# Patient Record
Sex: FEMALE | Race: WHITE
[De-identification: names, ages, dates, MRNs, and addresses within clinical notes are randomized per-mention and may not be internally consistent; named-entity substitution may affect disease eponyms.]

---

## 2024-03-08 PROBLEM — Z00.00 ENCOUNTER FOR PREVENTIVE HEALTH EXAMINATION: Status: ACTIVE | Noted: 2024-03-08

## 2024-03-15 ENCOUNTER — NON-APPOINTMENT (OUTPATIENT)
Age: 69
End: 2024-03-15

## 2024-03-15 DIAGNOSIS — Z82.1 FAMILY HISTORY OF BLINDNESS AND VISUAL LOSS: ICD-10-CM

## 2024-03-15 DIAGNOSIS — Z86.39 PERSONAL HISTORY OF OTHER ENDOCRINE, NUTRITIONAL AND METABOLIC DISEASE: ICD-10-CM

## 2024-03-15 DIAGNOSIS — F17.200 NICOTINE DEPENDENCE, UNSPECIFIED, UNCOMPLICATED: ICD-10-CM

## 2024-03-15 DIAGNOSIS — Z83.511 FAMILY HISTORY OF GLAUCOMA: ICD-10-CM

## 2024-03-15 DIAGNOSIS — Z92.3 PERSONAL HISTORY OF IRRADIATION: ICD-10-CM

## 2024-03-15 DIAGNOSIS — Z84.1 FAMILY HISTORY OF DISORDERS OF KIDNEY AND URETER: ICD-10-CM

## 2024-03-15 DIAGNOSIS — Z83.518 FAMILY HISTORY OF OTHER SPECIFIED EYE DISORDER: ICD-10-CM

## 2024-03-15 DIAGNOSIS — R73.03 PREDIABETES.: ICD-10-CM

## 2024-03-15 RX ORDER — ATORVASTATIN CALCIUM 20 MG/1
20 TABLET, FILM COATED ORAL
Refills: 0 | Status: ACTIVE | COMMUNITY

## 2024-03-15 RX ORDER — LEVOTHYROXINE SODIUM 100 UG/1
100 CAPSULE ORAL
Refills: 0 | Status: ACTIVE | COMMUNITY

## 2024-03-28 ENCOUNTER — LABORATORY RESULT (OUTPATIENT)
Age: 69
End: 2024-03-28

## 2024-03-28 ENCOUNTER — TRANSCRIPTION ENCOUNTER (OUTPATIENT)
Age: 69
End: 2024-03-28

## 2024-03-28 ENCOUNTER — APPOINTMENT (OUTPATIENT)
Dept: HEMATOLOGY ONCOLOGY | Facility: CLINIC | Age: 69
End: 2024-03-28
Payer: MEDICARE

## 2024-03-28 VITALS
WEIGHT: 124 LBS | RESPIRATION RATE: 18 BRPM | SYSTOLIC BLOOD PRESSURE: 121 MMHG | HEART RATE: 67 BPM | TEMPERATURE: 97.1 F | HEIGHT: 60 IN | DIASTOLIC BLOOD PRESSURE: 83 MMHG | BODY MASS INDEX: 24.35 KG/M2 | OXYGEN SATURATION: 98 %

## 2024-03-28 DIAGNOSIS — Z15.01 GENETIC SUSCEPTIBILITY TO MALIGNANT NEOPLASM OF BREAST: ICD-10-CM

## 2024-03-28 DIAGNOSIS — Z15.09 GENETIC SUSCEPTIBILITY TO MALIGNANT NEOPLASM OF BREAST: ICD-10-CM

## 2024-03-28 DIAGNOSIS — Z87.39 PERSONAL HISTORY OF OTHER DISEASES OF THE MUSCULOSKELETAL SYSTEM AND CONNECTIVE TISSUE: ICD-10-CM

## 2024-03-28 DIAGNOSIS — Z85.3 PERSONAL HISTORY OF MALIGNANT NEOPLASM OF BREAST: ICD-10-CM

## 2024-03-28 LAB
ALBUMIN SERPL ELPH-MCNC: 3.8 G/DL
ALP BLD-CCNC: 57 U/L
ALT SERPL-CCNC: 10 U/L
ANION GAP SERPL CALC-SCNC: 2 MMOL/L
AST SERPL-CCNC: 26 U/L
BILIRUB SERPL-MCNC: 1.1 MG/DL
BUN SERPL-MCNC: 10 MG/DL
CALCIUM SERPL-MCNC: 9.7 MG/DL
CHLORIDE SERPL-SCNC: 109 MMOL/L
CO2 SERPL-SCNC: 29 MMOL/L
CREAT SERPL-MCNC: 0.6 MG/DL
EGFR: 98 ML/MIN/1.73M2
GLUCOSE SERPL-MCNC: 96 MG/DL
POTASSIUM SERPL-SCNC: 5 MMOL/L
PROT SERPL-MCNC: 7.1 G/DL
SODIUM SERPL-SCNC: 140 MMOL/L

## 2024-03-28 PROCEDURE — 36415 COLL VENOUS BLD VENIPUNCTURE: CPT

## 2024-03-28 PROCEDURE — 99214 OFFICE O/P EST MOD 30 MIN: CPT

## 2024-03-28 PROCEDURE — G2211 COMPLEX E/M VISIT ADD ON: CPT

## 2024-03-28 RX ORDER — MAGNESIUM 200 MG
200 TABLET ORAL DAILY
Refills: 0 | Status: ACTIVE | COMMUNITY
Start: 2024-03-28

## 2024-03-28 RX ORDER — UBIDECARENONE/VIT E ACET 100MG-5
25 MCG CAPSULE ORAL DAILY
Refills: 0 | Status: ACTIVE | COMMUNITY
Start: 2024-03-28

## 2024-03-28 RX ORDER — LORATADINE 5 MG/5 ML
10 SOLUTION, ORAL ORAL DAILY
Refills: 0 | Status: ACTIVE | COMMUNITY
Start: 2024-03-28

## 2024-03-28 RX ORDER — VIT C/HESPERIDIN/BIOFLAVONOIDS 500-100 MG
30 TABLET ORAL DAILY
Refills: 0 | Status: ACTIVE | COMMUNITY
Start: 2024-03-28

## 2024-03-28 RX ORDER — ACETAMINOPHEN 500 MG
1200-1000 TABLET ORAL DAILY
Refills: 0 | Status: ACTIVE | COMMUNITY
Start: 2024-03-28

## 2024-03-28 NOTE — ASSESSMENT
[FreeTextEntry1] : Impression 68-year-old Ashkenazi female BRCA1 positive presented with bilateral locally advanced breast cancer in August 2009 s/p neoadjuvant dose dense AC-T with bilateral CR.  Patient is s/p BSO had had previous hysterectomy.  Patient has been followed with enhanced surveillance.  Patient new findings in right breast to be evaluated further with biopsy patient if positive patient will be referred to s was still patient refusing mastectomy may be considered for second chance lumpectomy if lesions in same quadrant.  Patient will be scheduled for bilateral breast MRI.  Smoking cessation was reviewed in detail.  Diet and exercise were reviewed.  Continue enhanced surveillance for lung cancer.  Continue medications for hypothyroid.  Patient is has impending dental implants antiresorptive treatment is on hold at present.  Patient is s/p [id number]. F/u pendding eval breast lesions  Check cbc, chem markers

## 2024-03-28 NOTE — PHYSICAL EXAM
[Normal] : affect appropriate [de-identified] : S/P bilat wle wbrt [de-identified] : S/P bilat SLN

## 2024-03-28 NOTE — HISTORY OF PRESENT ILLNESS
[de-identified] :  at age 54-year-old Ashkenazi female who noted a mass in her left breast more than 1 year ago and felt this was due to known cyst. The patient noted a change over recent months with change in overlying skin without trauma, bruising, nipple bleeding or discharge. Of note, for the past 3 months, the patient has noted a mass in her right breast. The patient sought medical attention and underwent bilateral mammography and sonography on 09 revealing left breast 3.5 x 3 cm suspicious dense mass corresponding to palpable finding and confirmed by ultrasound measuring 3.3 cm and right breast 7 mm calcifications which are indeterminate in the upper outer breast and a partially seen mass at the 7-8 o'clock region better seen on ultrasound corresponding with palpable mass 2.0 x 0.8 cm, right axilla demonstrated 2 nodes with fatty carlos. 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 Further workup was done 09 with PET CT which revealed bilateral breast masses left measuring 3.7 cm in greatest dimension, right 2.5 cm compatible with bilateral primary lesions, multiple embolically active lymph nodes in the right axilla, left axilla prominent but not enlarged lymph nodes. 09 gated cardiac scan was normal, resting LVEF 70%. Neoadjuvant chemotherapy was suggested prior to surgery. The patient sought another opinion and patient was also given the option of surgery followed by adjuvant chemotherapy. She now presents to discuss treatment options.  Of note, the patient underwent bilateral breast excisions for benign disease 20 years ago. Bilateral WLE and SLN -CR presenting with synchronous locally advanced breast cancer S/P AC/Taxol.  PS/P BSO since she was positive for BRCA.  The patient's original tumor right invasive ductal, poorly differentiated, ER positive, DC positive, Her-2/luciano negative, left breast invasive ductal carcinoma, ER negative, DC positive, Her-2/luciano negative. S/P bilat WBRT.. Letrozole 8/10-  Past Medical History: Diagnosis	Date -	Cancer	  	breast -	Exposure to secondhand smoke	  -	Hyperlipidemia	  -	Hypothyroid	  -	Malignant neoplasm of breast	  -	Osteoporosis	  -	Personal history of radiation exposure	  -	Pre-diabetes	  -	Pulmonary emphysema	      Past Surgical History: -	APPENDECTOMY	 	  -	CANCER SURGERY	 	   	breast cancer -	DILATION/CURETTAGE,DIAGNOSTIC/THERAPEUTIC	 	  -	EXPLORATORY LAPAROTOMY OF ABDOMEN	 	  	appendectomy -	HYSTERECTOMY	 	  -	INDUCED ABORTN BY [facility]	 	,  -	OOPHORECTOMY	 	     Social History  She is an artist, emigrated in .Lives with  Tobacco Use -	Smoking status:	Every Day  	 	Packs/day:	1.00  	 	Years:	40.00  	 	Pack years:	40.00  	 	Types:	Cigarettes -	Smokeless tobacco:	Never -	Tobacco comments:  	 	was 1 ppd 30 yrs. now down to 3-4/day for 3-4 yrs Substance Use Topics -	Alcohol use:	No -	Drug use:	No     Menarche age 16.  AB3. LMP 51.   x 2, 31 and 35, breast-fed for 15 months each.S/P hys and BSO  .  FAMILY HISTORY:BRCA 1+ [place] descent, mother  of renal failure at age 62 and had 1 sister and 3 half-brothers and 5 half-sisters through her father. Maternal grandfather  of gastric cancer. Father has 1 sister; she has 2 sons and is an only child.     [de-identified] : dx osteoporois  - undergoing detnal eval letrozole 8/2010- 2022 Last breast MRI 1122 [facility]  11/13/2023 CT lung screening unchanged pulmonary nodules CAD, new healing posttraumatic or subacute nondisplaced fracture of right anterolateral third and fourth ribs continue annual surveillance. Bone density 3/11/2024 osteoporosis with improvement compared to previous bone density 3/11/2024 bilateral mammogram and sonogram incomplete additional imagingRight diagnostic ultrasound indeterminate masses right 3:00 and right 10:00 ultrasound-guided core biopsies were rec has been recommended and have been scheduled

## 2024-03-29 LAB
CANCER AG125 SERPL-ACNC: 14 U/ML
CANCER AG15-3 SERPL-ACNC: 16.2 U/ML
CEA SERPL-MCNC: 4.3 NG/ML

## 2025-03-12 ENCOUNTER — NON-APPOINTMENT (OUTPATIENT)
Age: 70
End: 2025-03-12

## 2025-03-18 ENCOUNTER — APPOINTMENT (OUTPATIENT)
Dept: HEMATOLOGY ONCOLOGY | Facility: CLINIC | Age: 70
End: 2025-03-18
Payer: MEDICARE

## 2025-03-18 ENCOUNTER — NON-APPOINTMENT (OUTPATIENT)
Age: 70
End: 2025-03-18

## 2025-03-18 VITALS
TEMPERATURE: 97.8 F | RESPIRATION RATE: 18 BRPM | DIASTOLIC BLOOD PRESSURE: 62 MMHG | SYSTOLIC BLOOD PRESSURE: 87 MMHG | OXYGEN SATURATION: 99 % | HEART RATE: 67 BPM

## 2025-03-18 VITALS — BODY MASS INDEX: 22.03 KG/M2 | WEIGHT: 112.19 LBS | HEIGHT: 60 IN

## 2025-03-18 DIAGNOSIS — Z15.09 GENETIC SUSCEPTIBILITY TO MALIGNANT NEOPLASM OF BREAST: ICD-10-CM

## 2025-03-18 DIAGNOSIS — E78.00 PURE HYPERCHOLESTEROLEMIA, UNSPECIFIED: ICD-10-CM

## 2025-03-18 DIAGNOSIS — Z15.01 GENETIC SUSCEPTIBILITY TO MALIGNANT NEOPLASM OF BREAST: ICD-10-CM

## 2025-03-18 DIAGNOSIS — Z85.3 PERSONAL HISTORY OF MALIGNANT NEOPLASM OF BREAST: ICD-10-CM

## 2025-03-18 DIAGNOSIS — Z87.39 PERSONAL HISTORY OF OTHER DISEASES OF THE MUSCULOSKELETAL SYSTEM AND CONNECTIVE TISSUE: ICD-10-CM

## 2025-03-18 DIAGNOSIS — E03.9 HYPOTHYROIDISM, UNSPECIFIED: ICD-10-CM

## 2025-03-18 LAB
ALBUMIN SERPL ELPH-MCNC: 4.1 G/DL
ALP BLD-CCNC: 67 U/L
ALT SERPL-CCNC: 17 U/L
ANION GAP SERPL CALC-SCNC: 5 MMOL/L
AST SERPL-CCNC: 40 U/L
BILIRUB SERPL-MCNC: 1.2 MG/DL
BUN SERPL-MCNC: 11 MG/DL
CALCIUM SERPL-MCNC: 10.1 MG/DL
CHLORIDE SERPL-SCNC: 110 MMOL/L
CO2 SERPL-SCNC: 27 MMOL/L
CREAT SERPL-MCNC: 0.7 MG/DL
EGFRCR SERPLBLD CKD-EPI 2021: 94 ML/MIN/1.73M2
GLUCOSE SERPL-MCNC: 90 MG/DL
HCT VFR BLD CALC: 38.9 %
HGB BLD-MCNC: 13.3 G/DL
LYMPHOCYTES # BLD AUTO: 1.5 K/UL
LYMPHOCYTES NFR BLD AUTO: 22.9 %
MAN DIFF?: NO
MCHC RBC-ENTMCNC: 29.8 PG
MCHC RBC-ENTMCNC: 34.2 G/DL
MCV RBC AUTO: 87.2 FL
NEUTROPHILS # BLD AUTO: 4.3 K/UL
NEUTROPHILS NFR BLD AUTO: 67.8 %
PLATELET # BLD AUTO: 312 K/UL
POTASSIUM SERPL-SCNC: 4.7 MMOL/L
PROT SERPL-MCNC: 7.7 G/DL
RBC # BLD: 4.46 M/UL
RBC # FLD: 14.4 %
SODIUM SERPL-SCNC: 142 MMOL/L
WBC # FLD AUTO: 6.4 K/UL

## 2025-03-18 PROCEDURE — 36415 COLL VENOUS BLD VENIPUNCTURE: CPT

## 2025-03-18 PROCEDURE — 99214 OFFICE O/P EST MOD 30 MIN: CPT | Mod: 25

## 2025-03-19 LAB
CANCER AG125 SERPL-ACNC: 16 U/ML
CANCER AG15-3 SERPL-ACNC: 17.8 U/ML
CEA SERPL-MCNC: 4.6 NG/ML

## 2025-05-30 ENCOUNTER — TRANSCRIPTION ENCOUNTER (OUTPATIENT)
Age: 70
End: 2025-05-30